# Patient Record
Sex: MALE | Race: BLACK OR AFRICAN AMERICAN | NOT HISPANIC OR LATINO | Employment: OTHER | URBAN - METROPOLITAN AREA
[De-identification: names, ages, dates, MRNs, and addresses within clinical notes are randomized per-mention and may not be internally consistent; named-entity substitution may affect disease eponyms.]

---

## 2020-12-11 ENCOUNTER — TELEPHONE (OUTPATIENT)
Dept: NEUROSURGERY | Facility: CLINIC | Age: 60
End: 2020-12-11

## 2020-12-17 ENCOUNTER — TELEPHONE (OUTPATIENT)
Dept: NEUROSURGERY | Facility: CLINIC | Age: 60
End: 2020-12-17

## 2020-12-17 NOTE — TELEPHONE ENCOUNTER
[facility]AS[facility] FROM [facility] CALLED TO CHECK STATUS OF REFERRAL. STATES SHE WILL REACH OUT TO PT TO HAVE HIM CALL OFFICE TO SCHEDULE.     JASON STATES SHE CAN ALSO SCHEDULE ON HIS BEHALF - PT PREFERS MORNINGS.     JASON'S DIRECT LINE [phone], CAN CALL [phone] IF DIRECT LINE BUSY TO LEAVE MESSAGE.    PLEASE CALL JASON TO SCHEDULE IF UNABLE TO REACH PT.     HUB UNABLE TO WARM TRANSFER AT TIME OF CALL.    THANK YOU.

## 2021-01-25 RX ORDER — ECHINACEA PURPUREA EXTRACT 125 MG
1 TABLET ORAL 4 TIMES DAILY PRN
COMMUNITY
End: 2021-04-14

## 2021-01-25 RX ORDER — AMLODIPINE BESYLATE 5 MG/1
0.5 TABLET ORAL
COMMUNITY

## 2021-01-25 RX ORDER — GABAPENTIN 600 MG/1
0.5 TABLET ORAL 3 TIMES DAILY
COMMUNITY
Start: 2020-10-27 | End: 2021-02-01

## 2021-01-25 RX ORDER — CYCLOBENZAPRINE HCL 10 MG
1 TABLET ORAL 2 TIMES DAILY PRN
COMMUNITY
Start: 2020-10-27 | End: 2021-02-01

## 2021-01-25 RX ORDER — CARVEDILOL 25 MG/1
1 TABLET ORAL 2 TIMES DAILY
COMMUNITY

## 2021-01-25 RX ORDER — ATORVASTATIN CALCIUM 40 MG/1
40 TABLET, FILM COATED ORAL DAILY
COMMUNITY
Start: 2020-10-26

## 2021-01-25 RX ORDER — ERGOCALCIFEROL 1.25 MG/1
1 CAPSULE ORAL WEEKLY
COMMUNITY
Start: 2020-12-01

## 2021-01-25 RX ORDER — ASPIRIN 81 MG/1
1 TABLET ORAL DAILY
COMMUNITY

## 2021-01-27 ENCOUNTER — TELEPHONE (OUTPATIENT)
Dept: NEUROSURGERY | Facility: CLINIC | Age: 61
End: 2021-01-27

## 2021-02-01 ENCOUNTER — OFFICE VISIT (OUTPATIENT)
Dept: NEUROSURGERY | Facility: CLINIC | Age: 61
End: 2021-02-01

## 2021-02-01 VITALS
BODY MASS INDEX: 34.19 KG/M2 | SYSTOLIC BLOOD PRESSURE: 138 MMHG | HEART RATE: 79 BPM | DIASTOLIC BLOOD PRESSURE: 80 MMHG | WEIGHT: 225.6 LBS | OXYGEN SATURATION: 98 % | HEIGHT: 68 IN

## 2021-02-01 DIAGNOSIS — M54.32 BACK PAIN WITH LEFT-SIDED SCIATICA: Primary | ICD-10-CM

## 2021-02-01 DIAGNOSIS — M54.2 NECK PAIN WITHOUT INJURY: ICD-10-CM

## 2021-02-01 PROCEDURE — 99203 OFFICE O/P NEW LOW 30 MIN: CPT | Performed by: NEUROLOGICAL SURGERY

## 2021-02-01 RX ORDER — SILDENAFIL 100 MG/1
50-100 TABLET, FILM COATED ORAL AS NEEDED
COMMUNITY
Start: 2020-10-12 | End: 2021-10-08 | Stop reason: HOSPADM

## 2021-04-13 NOTE — PROGRESS NOTES
Subjective   History of Present Illness: Chaz Drake is a 60 y.o. male is here today for follow-up after pain management. Today Mr. Drake reports that he had 1 LESI with no relief. He is scheduled to get another MARTHA.  He reports that the injection may have helped for approximately a week.  He continues to complain of daily severe left posterior thigh pain that radiates down to his ankle.  He denies any new numbness or weakness.  He reports some sensitivity over his left groin specifically his penis.  Denies any incontinence.     History of Present Illness    The following portions of the patient's history were reviewed and updated as appropriate: allergies, past family history, past medical history, past social history, past surgical history and problem list.    Past Medical History:   Diagnosis Date   • Cancer (CMS/HCC)    • Foot drop, left    • Hypertension    • Vitamin D deficiency         Past Surgical History:   Procedure Laterality Date   • ACHILLES TENDON LENGTHENING Left    • CHOLECYSTECTOMY     • FOOT TENDON TRANSFER Left    • REPLACEMENT TOTAL KNEE Left    • TOE SURGERY Left     Metal pin placed in left big toe   • TOTAL KNEE ARTHROPLASTY REVISION Left    • TUMOR REMOVAL      Lymphoma L side chest          Current Outpatient Medications:   •  amLODIPine (NORVASC) 5 MG tablet, Take 0.5 tablet/day by mouth., Disp: , Rfl:   •  aspirin (Aspir-Low) 81 MG EC tablet, Take 1 tablet by mouth Daily., Disp: , Rfl:   •  atorvastatin (LIPITOR) 40 MG tablet, Take 40 mg by mouth Daily., Disp: , Rfl:   •  carvedilol (COREG) 25 MG tablet, Take 1 tablet by mouth 2 (two) times a day., Disp: , Rfl:   •  sildenafil (VIAGRA) 100 MG tablet, Take  mg by mouth As Needed., Disp: , Rfl:   •  vitamin D (ERGOCALCIFEROL) 1.25 MG (95724 UT) capsule capsule, Take 1 capsule by mouth 1 (One) Time Per Week., Disp: , Rfl:      Social History     Socioeconomic History   • Marital status:      Spouse name: Not on file   •  "Number of children: Not on file   • Years of education: Not on file   • Highest education level: Not on file   Tobacco Use   • Smoking status: Never Smoker   • Smokeless tobacco: Never Used   Vaping Use   • Vaping Use: Never used   Substance and Sexual Activity   • Alcohol use: Not Currently   • Drug use: Never   • Sexual activity: Defer        Family History   Problem Relation Age of Onset   • Hypertension Mother    • Hypertension Father    • Stroke Father         Review of Systems   Constitutional: Negative for chills and fever.   Respiratory: Negative for cough and shortness of breath.    Genitourinary: Negative for difficulty urinating and enuresis.   Musculoskeletal: Positive for back pain (intermittent moderate LBP that radiates to posterior portion left thigh), gait problem (Instability; uses cane to assist w/ambulation), neck pain (right side that radiates down shoulder and at times up around ear) and neck stiffness.   Neurological: Positive for weakness (Left leg) and numbness (N/T left leg with burning sensation posterior thigh area).       Objective     Vitals:    04/14/21 1341   BP: 134/86   Pulse: 69   SpO2: 97%   Weight: 102 kg (224 lb 14.4 oz)   Height: 172.7 cm (68\")     Body mass index is 34.2 kg/m².      Physical Exam  Neurologic Exam  Awake, alert, oriented x3  Pupils equal round reactive to light  Extraocular muscles intact  Face symmetric  Speech is fluent and clear  No pronator drift  Motor exam  Bilateral deltoids 5/5, bilateral biceps 5/5, bilateral triceps 5/5, bilateral wrist extension 5/5 bilateral hand  5/5  Bilateral hip flexion 5/5, bilateral knee extension 5/5, right DF/PF 5/5, left DF/PF 1/5   no clonus  No Ana's reflex  Unsteady gait, uses cane for assistance   able to detect  light touch in all 4 extremities      Assessment/Plan   Independent Review of Radiographic Studies:      I personally reviewed the images from the following studies.    No new imaging " studies    Medical Decision Makin-year-old male with a 1 year history of worsening lower back pain and left lower extremity pain  -He had 1 steroid epidural injection which helped for approximately a week but the pain is returned.  He describes a burning sensation down his posterior thigh.  The pain starts near his coccyx and spreads down the back of his thigh and then around his and anterior lower extremity to the ankle.  He also describes some increased sensitivity in his groin and around the tip of his penis concerning for compression of a lower sacral nerve root.  -The previous MRI images are not available for me to review today.  The previous report did indicate some enhancement of an L2 nerve root and degenerative disease at L4-5 and L5-S1.  I will order a repeat MRI with and without contrast of the lumbar spine and sacrum to evaluate for sacral nerve compression on the left.  I will also order an EMG and plan to follow-up in the next 2 to 3 months.  -I have offered him gabapentin, but he would like to hold off for now  -I have asked him to bring in a copy of his previous MRI after the new MRI is obtained for comparison    Diagnoses and all orders for this visit:    1. Back pain with left-sided sciatica (Primary)  -     MRI Lumbar Spine With & Without Contrast; Future  -     EMG Left Leg; Future  -     Nerve Conduction Test Left Leg; Future  -     MRI Pelvis With & Without Contrast; Future    2. Lumbosacral radiculopathy at S1  -     MRI Lumbar Spine With & Without Contrast; Future  -     EMG Left Leg; Future  -     Nerve Conduction Test Left Leg; Future  -     MRI Pelvis With & Without Contrast; Future    3. 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

## 2021-04-14 ENCOUNTER — OFFICE VISIT (OUTPATIENT)
Dept: NEUROSURGERY | Facility: CLINIC | Age: 61
End: 2021-04-14

## 2021-04-14 VITALS
WEIGHT: 224.9 LBS | DIASTOLIC BLOOD PRESSURE: 86 MMHG | SYSTOLIC BLOOD PRESSURE: 134 MMHG | BODY MASS INDEX: 34.08 KG/M2 | HEART RATE: 69 BPM | OXYGEN SATURATION: 97 % | HEIGHT: 68 IN

## 2021-04-14 DIAGNOSIS — S34.22XS: ICD-10-CM

## 2021-04-14 DIAGNOSIS — M54.17 LUMBOSACRAL RADICULOPATHY AT S1: ICD-10-CM

## 2021-04-14 DIAGNOSIS — M54.32 BACK PAIN WITH LEFT-SIDED SCIATICA: Primary | ICD-10-CM

## 2021-04-14 PROCEDURE — 99214 OFFICE O/P EST MOD 30 MIN: CPT | Performed by: NEUROLOGICAL SURGERY

## 2021-05-04 ENCOUNTER — TELEPHONE (OUTPATIENT)
Dept: NEUROSURGERY | Facility: CLINIC | Age: 61
End: 2021-05-04

## 2021-05-04 DIAGNOSIS — M54.17 LUMBOSACRAL RADICULOPATHY AT S1: ICD-10-CM

## 2021-05-04 DIAGNOSIS — M54.32 BACK PAIN WITH LEFT-SIDED SCIATICA: Primary | ICD-10-CM

## 2021-05-05 ENCOUNTER — HOSPITAL ENCOUNTER (OUTPATIENT)
Dept: MRI IMAGING | Facility: HOSPITAL | Age: 61
Discharge: HOME | End: 2021-05-05
Admitting: NEUROLOGICAL SURGERY

## 2021-05-05 ENCOUNTER — HOSPITAL ENCOUNTER (OUTPATIENT)
Dept: MRI IMAGING | Facility: HOSPITAL | Age: 61
End: 2021-05-05

## 2021-05-05 DIAGNOSIS — M54.17 LUMBOSACRAL RADICULOPATHY AT S1: ICD-10-CM

## 2021-05-05 DIAGNOSIS — M54.32 BACK PAIN WITH LEFT-SIDED SCIATICA: ICD-10-CM

## 2021-05-05 DIAGNOSIS — S34.22XS: ICD-10-CM

## 2021-05-05 LAB — CREAT BLDA-MCNC: 1.2 MG/DL (ref 0.6–1.3)

## 2021-05-05 PROCEDURE — 72197 MRI PELVIS W/O & W/DYE: CPT

## 2021-05-05 PROCEDURE — A9577 INJ MULTIHANCE: HCPCS | Performed by: NEUROLOGICAL SURGERY

## 2021-05-05 PROCEDURE — 82565 ASSAY OF CREATININE: CPT

## 2021-05-05 PROCEDURE — 0 GADOBENATE DIMEGLUMINE 529 MG/ML SOLUTION: Performed by: NEUROLOGICAL SURGERY

## 2021-05-05 RX ADMIN — GADOBENATE DIMEGLUMINE 19 ML: 529 INJECTION, SOLUTION INTRAVENOUS at 10:24

## 2021-05-10 RX ORDER — DIAZEPAM 5 MG/1
5 TABLET ORAL ONCE
Qty: 1 TABLET | Refills: 0 | Status: SHIPPED | OUTPATIENT
Start: 2021-05-10 | End: 2021-05-10

## 2021-05-27 ENCOUNTER — HOSPITAL ENCOUNTER (OUTPATIENT)
Dept: MRI IMAGING | Facility: HOSPITAL | Age: 61
Discharge: HOME | End: 2021-05-27
Admitting: NEUROLOGICAL SURGERY

## 2021-05-27 DIAGNOSIS — M54.17 LUMBOSACRAL RADICULOPATHY AT S1: ICD-10-CM

## 2021-05-27 DIAGNOSIS — M54.32 BACK PAIN WITH LEFT-SIDED SCIATICA: ICD-10-CM

## 2021-05-27 PROCEDURE — A9577 INJ MULTIHANCE: HCPCS | Performed by: NEUROLOGICAL SURGERY

## 2021-05-27 PROCEDURE — 72158 MRI LUMBAR SPINE W/O & W/DYE: CPT

## 2021-05-27 PROCEDURE — 0 GADOBENATE DIMEGLUMINE 529 MG/ML SOLUTION: Performed by: NEUROLOGICAL SURGERY

## 2021-05-27 RX ADMIN — GADOBENATE DIMEGLUMINE 20 ML: 529 INJECTION, SOLUTION INTRAVENOUS at 09:50

## 2021-06-09 ENCOUNTER — HOSPITAL ENCOUNTER (OUTPATIENT)
Dept: INFUSION THERAPY | Facility: HOSPITAL | Age: 61
Discharge: HOME | End: 2021-06-09
Admitting: PSYCHIATRY & NEUROLOGY

## 2021-06-09 DIAGNOSIS — M54.17 LUMBOSACRAL RADICULOPATHY AT S1: ICD-10-CM

## 2021-06-09 DIAGNOSIS — M54.32 BACK PAIN WITH LEFT-SIDED SCIATICA: ICD-10-CM

## 2021-06-09 PROCEDURE — 95909 NRV CNDJ TST 5-6 STUDIES: CPT

## 2021-06-09 PROCEDURE — 95886 MUSC TEST DONE W/N TEST COMP: CPT | Performed by: PSYCHIATRY & NEUROLOGY

## 2021-06-09 PROCEDURE — 95886 MUSC TEST DONE W/N TEST COMP: CPT

## 2021-06-09 PROCEDURE — 95909 NRV CNDJ TST 5-6 STUDIES: CPT | Performed by: PSYCHIATRY & NEUROLOGY

## 2021-06-09 NOTE — PROCEDURES
EMG and Nerve Conduction Studies    I.      Instrument used: Neuromax 1002  II.     Please see data sheets for tabular summary of NCS and details on methods, temperatures and lab standards.   III.    EMG muscles tested for upper extremity studies include the deltoid, biceps, triceps, pronator teres, extensor digitorum communis, first dorsal interosseous and abductor pollicis brevis.    IV.   EMG muscles tested for lower extremity studies include the vastus lateralis, tibialis anterior, peroneus longus, medial gastrocnemius and extensor digitorum brevis.    V.    Additional muscles tested as needed.  Paraspinal muscles tested as needed.   VI.   Please see data sheets for tabular summary of EMG findings.   VII. The complete report includes the data sheets.      Indication: Left sciatica  History: 60-year-old -American male  with fairly longstanding left-sided sciatica.  MRI of the lumbar spine demonstrates some degenerative disc disease without severe stenosis at any level.  No history of diabetes.      Ht: 172.7 cm  Wt: 99.8 kg; BMI 33.45  HbA1C: No results found for: HGBA1C  TSH:   Lab Results   Component Value Date    TSH 1.970 10/23/2018       Technical summary:  Nerve conduction studies were obtained in the left leg.  Skin temperatures were generally above 32 °C.  Temperature correction was not needed.  Needle examination was obtained on selected muscles in the left leg    Results:  1.  Normal left sural sensory study.  2.  Normal left superficial peroneal sensory study.  3.  Normal left saphenous sensory study.  4.  Normal left peroneal motor study.  5.  Normal left tibial motor study.  6.  Needle examination of selected muscles of the left leg showed normal insertional activities throughout.  There were normal motor units and recruitment patterns throughout.  Lumbar paraspinals at L5 showed no abnormality.    Impression:  Normal study.  No evidence of peripheral neuropathy, sciatic neuropathy or a  left lumbosacral radiculopathy by this study.  Study results were discussed with the patient.    Zaid Castillo M.D.              Dictated utilizing Dragon dictation.

## 2021-07-23 ENCOUNTER — TELEPHONE (OUTPATIENT)
Dept: NEUROSURGERY | Facility: CLINIC | Age: 61
End: 2021-07-23

## 2021-07-23 NOTE — TELEPHONE ENCOUNTER
"I s/w patient re: his appt on 7/19/21. I confirmed that appt was canceled and not a \"No Show\". Unfortunately, where the appt was left on the schedule for EOD processing that is why is was marked as a \"No Show\". I informed the patient that we are aware that his appt was canceled. He has been r/s to 8/27/21 @ 3:30pm. I have added him to [facility] for possible cancellation.   "

## 2021-07-23 NOTE — TELEPHONE ENCOUNTER
Caller: Chaz Drake    Relationship to patient: Self    Best call back number:[phone]    Chief complaint:APPT.    Type of visit:FOLLOW UP    Requested date: ASAP    If rescheduling, when is the original appointment:NA    Additional notes:PT CALLED AND STATES THAT HE WANTS TO CLARIFY SOMETHING ON HIS MYCHART. PT STATES THAT HE HAS HAD NUMEROUS APPTS. WITH  THAT HAVE BEEN CANCELLED. PT STATES THAT HE HAD A VOICEMAIL FROM [facility] CANCELLING HIS APPT. ON Monday 07/19/21 WITH . PT WANTS THE STATUS OF NO SHOW CORRECTED-PT STATES HE WAS TOLD THE APPT. WAS CANCELLED AND SOMEONE WOULD CALL BACK FOR SCHEDULING THANK YOU!

## 2021-08-02 ENCOUNTER — OFFICE VISIT (OUTPATIENT)
Dept: NEUROSURGERY | Facility: CLINIC | Age: 61
End: 2021-08-02

## 2021-08-02 VITALS
HEART RATE: 67 BPM | SYSTOLIC BLOOD PRESSURE: 118 MMHG | BODY MASS INDEX: 33.34 KG/M2 | WEIGHT: 220 LBS | DIASTOLIC BLOOD PRESSURE: 76 MMHG | OXYGEN SATURATION: 96 % | HEIGHT: 68 IN

## 2021-08-02 DIAGNOSIS — M54.32 BACK PAIN WITH LEFT-SIDED SCIATICA: Primary | ICD-10-CM

## 2021-08-02 DIAGNOSIS — M54.2 NECK PAIN WITHOUT INJURY: ICD-10-CM

## 2021-08-02 PROCEDURE — 99214 OFFICE O/P EST MOD 30 MIN: CPT | Performed by: NEUROLOGICAL SURGERY

## 2021-08-02 NOTE — PROGRESS NOTES
Subjective   History of Present Illness: Chaz Drake is a 60 y.o. male is here today for follow-up with new MRI L-Spine, MRI Pelvis & EMG/NCS. He was last in the office 4/14/21 for back pain. Today Mr. Drake reports  he is doing well however continues to have some lower back as well as left sided leg pain.  He also describes a burning sensation in the bottom of his left foot.  He has had multiple surgeries on his left leg including a tendon transfer and lengthening of his Achilles tendon.  He has had lower back pain and left leg pain since 2012.  He has had some improvement since his last 2 steroid epidural injections.    History of Present Illness    The following portions of the patient's history were reviewed and updated as appropriate: allergies, past family history, past medical history, past social history, past surgical history and problem list.    Past Medical History:   Diagnosis Date   • Cancer (CMS/HCC)    • Foot drop, left    • Hypertension    • Vitamin D deficiency         Past Surgical History:   Procedure Laterality Date   • ACHILLES TENDON LENGTHENING Left    • CHOLECYSTECTOMY     • FOOT TENDON TRANSFER Left    • REPLACEMENT TOTAL KNEE Left    • TOE SURGERY Left     Metal pin placed in left big toe   • TOTAL KNEE ARTHROPLASTY REVISION Left    • TUMOR REMOVAL      Lymphoma L side chest          Current Outpatient Medications:   •  amLODIPine (NORVASC) 5 MG tablet, Take 0.5 tablet/day by mouth., Disp: , Rfl:   •  aspirin (Aspir-Low) 81 MG EC tablet, Take 1 tablet by mouth Daily., Disp: , Rfl:   •  atorvastatin (LIPITOR) 40 MG tablet, Take 40 mg by mouth Daily., Disp: , Rfl:   •  carvedilol (COREG) 25 MG tablet, Take 1 tablet by mouth 2 (two) times a day., Disp: , Rfl:   •  sildenafil (VIAGRA) 100 MG tablet, Take  mg by mouth As Needed., Disp: , Rfl:   •  vitamin D (ERGOCALCIFEROL) 1.25 MG (03078 UT) capsule capsule, Take 1 capsule by mouth 1 (One) Time Per Week., Disp: , Rfl:      Social  "History     Socioeconomic History   • Marital status:      Spouse name: Not on file   • Number of children: Not on file   • Years of education: Not on file   • Highest education level: Not on file   Tobacco Use   • Smoking status: Never Smoker   • Smokeless tobacco: Never Used   Vaping Use   • Vaping Use: Never used   Substance and Sexual Activity   • Alcohol use: Not Currently   • Drug use: Never   • Sexual activity: Defer        Family History   Problem Relation Age of Onset   • Hypertension Mother    • Hypertension Father    • Stroke Father         Review of Systems   Genitourinary: Negative for difficulty urinating and enuresis.   Musculoskeletal: Positive for back pain (intermittent; improving w/MARTHA) and gait problem (uses cane to assist w/ambulation).   Neurological: Positive for weakness (L leg \"not new\") and numbness (N/T R side of neck and behind R ear; L foot).       Objective     Vitals:    08/02/21 1308   BP: 118/76   Pulse: 67   SpO2: 96%   Weight: 99.8 kg (220 lb)   Height: 172.7 cm (68\")     Body mass index is 33.45 kg/m².      Physical Exam  Neurologic Exam  Awake, alert, oriented x3  Pupils equal round reactive to light  Extraocular muscles intact  Face symmetric  Speech is fluent and clear  No pronator drift  Motor exam  Bilateral deltoids 5/5, bilateral biceps 5/5, bilateral triceps 5/5, bilateral wrist extension 5/5 bilateral hand  5/5  Bilateral hip flexion 5/5, bilateral knee extension 5/5, left dorsiflexion 3/5, right dorsiflexion 5/5, left plantar flexion 4/5, right plantar flexion 5/5  No clonus  No Ana's reflex  Able to walk across the office without assistance, uses a cane for walking longer distances.  Has a slightly unsteady gait due to incomplete dorsiflexion on the left  Able to detect  light touch in all 4 extremities        Assessment/Plan   Independent Review of Radiographic Studies:      I personally reviewed the images from the following studies.  MRI of the lumbar " spine with and without contrast from May 27, 2021  There is mild degenerative changes throughout his lumbar spine most significantly at L5-S1 where there is bilateral neuroforaminal stenosis.  The stenosis appears to be mild to moderate with mild compression of the exiting L5 nerve roots    Medical Decision Makin-year-old male with history of lower back pain and left lower extremity pain since .  -He has a complex history with multiple procedures on the left lower extremity including a tendon transfer in his foot as well as a lengthening of the Achilles tendon on the left.  He has had significant decrease in mobility at the left ankle since all of the surgeries.  The left toe is also fused.   -He had a recent EMG which was reported to be a normal study  -His MRI was reviewed and does show mild to moderate bilateral neuroforaminal stenosis at L5-S1.  There is no obvious lesions or severe compression  -Today he reports that he is supposed to receive his third lumbar steroid epidural injection on September 15 and would like to follow-up a few weeks after that injection to evaluate for any additional improvement.  He is currently in physical therapy for right sided neck pain that extends up the right side of his head.  He reports that he has had surgery on his chest previously which he feels has led to decreased mobility of his neck and may be causing some of his pain.. He does not have any evidence of myelopathy however his neck pain is severe at times.  I will plan to order an MRI of his cervical spine to evaluate for any evidence of upper cervical neuroforaminal stenosis    Diagnoses and all orders for this visit:    1. Back pain with left-sided sciatica (Primary)    2. Neck pain without injury  -     MRI Cervical Spine Without Contrast; Future      Return in about 3 months (around 2021).

## 2021-08-26 ENCOUNTER — HOSPITAL ENCOUNTER (OUTPATIENT)
Dept: MRI IMAGING | Facility: HOSPITAL | Age: 61
Discharge: HOME | End: 2021-08-26
Admitting: NEUROLOGICAL SURGERY

## 2021-08-26 DIAGNOSIS — M54.2 NECK PAIN WITHOUT INJURY: ICD-10-CM

## 2021-08-26 PROCEDURE — 72141 MRI NECK SPINE W/O DYE: CPT

## 2021-10-08 ENCOUNTER — OFFICE VISIT (OUTPATIENT)
Dept: NEUROSURGERY | Facility: CLINIC | Age: 61
End: 2021-10-08

## 2021-10-08 VITALS
TEMPERATURE: 98.4 F | HEART RATE: 75 BPM | SYSTOLIC BLOOD PRESSURE: 142 MMHG | DIASTOLIC BLOOD PRESSURE: 86 MMHG | WEIGHT: 226 LBS | BODY MASS INDEX: 34.25 KG/M2 | HEIGHT: 68 IN

## 2021-10-08 DIAGNOSIS — M54.2 NECK PAIN: Primary | ICD-10-CM

## 2021-10-08 PROCEDURE — 99213 OFFICE O/P EST LOW 20 MIN: CPT | Performed by: NEUROLOGICAL SURGERY

## 2021-10-12 ENCOUNTER — TELEPHONE (OUTPATIENT)
Dept: NEUROSURGERY | Facility: CLINIC | Age: 61
End: 2021-10-12

## 2021-10-12 NOTE — TELEPHONE ENCOUNTER
Suyapa-Nurse from [facility] called and would like for Dr. Troncoso's MA to give her a call. She would like to know what the recommendations from Dr. Troncoso's last office note. Please advise!    Fitz :  [phone] she stated you can leave VM.

## 2021-10-13 NOTE — TELEPHONE ENCOUNTER
Chela-Nurse from [facility] called back with fax number [phone] please put attention to Chela, if any questions please call her.     THANK YOU

## 2021-10-14 NOTE — TELEPHONE ENCOUNTER
Left vm for Francisca. I have received the fax number to fax records. I do need to know who she is to the patient and what she is needing the note for? Left vm.

## 2021-10-15 NOTE — TELEPHONE ENCOUNTER
I spoke to Francisca kuhn is from the [facility] which referred pt to Dr. Troncoso. I will fax over patients office note with recommendation of conservative treatment and follow up as needed with our office.